# Patient Record
Sex: FEMALE | Race: WHITE | NOT HISPANIC OR LATINO | Employment: OTHER | URBAN - NONMETROPOLITAN AREA
[De-identification: names, ages, dates, MRNs, and addresses within clinical notes are randomized per-mention and may not be internally consistent; named-entity substitution may affect disease eponyms.]

---

## 2024-10-23 ENCOUNTER — OFFICE VISIT (OUTPATIENT)
Dept: CARDIOLOGY | Facility: CLINIC | Age: 84
End: 2024-10-23
Payer: MEDICARE

## 2024-10-23 VITALS
OXYGEN SATURATION: 97 % | SYSTOLIC BLOOD PRESSURE: 145 MMHG | HEART RATE: 103 BPM | WEIGHT: 177.2 LBS | DIASTOLIC BLOOD PRESSURE: 74 MMHG

## 2024-10-23 DIAGNOSIS — R53.83 FATIGUE, UNSPECIFIED TYPE: ICD-10-CM

## 2024-10-23 DIAGNOSIS — I10 PRIMARY HYPERTENSION: ICD-10-CM

## 2024-10-23 DIAGNOSIS — M79.89 LEG SWELLING: ICD-10-CM

## 2024-10-23 DIAGNOSIS — R55 SYNCOPE AND COLLAPSE: Primary | ICD-10-CM

## 2024-10-23 DIAGNOSIS — R06.02 SHORTNESS OF BREATH: ICD-10-CM

## 2024-10-23 DIAGNOSIS — R42 DIZZY: ICD-10-CM

## 2024-10-23 RX ORDER — MONTELUKAST SODIUM 10 MG/1
10 TABLET ORAL NIGHTLY
COMMUNITY
End: 2024-10-23 | Stop reason: SDUPTHER

## 2024-10-23 RX ORDER — GABAPENTIN 300 MG/1
300 CAPSULE ORAL 2 TIMES DAILY
COMMUNITY

## 2024-10-23 RX ORDER — LEVOTHYROXINE SODIUM 75 UG/1
75 TABLET ORAL DAILY
COMMUNITY

## 2024-10-23 RX ORDER — ROSUVASTATIN CALCIUM 10 MG/1
10 TABLET, COATED ORAL DAILY
COMMUNITY

## 2024-10-23 RX ORDER — FLUTICASONE PROPIONATE 50 UG/1
2 SPRAY, METERED NASAL DAILY
COMMUNITY

## 2024-10-23 RX ORDER — LOSARTAN POTASSIUM 25 MG/1
25 TABLET ORAL DAILY
Qty: 30 TABLET | Refills: 6 | Status: SHIPPED | OUTPATIENT
Start: 2024-10-23

## 2024-10-23 RX ORDER — LANOLIN ALCOHOL/MO/W.PET/CERES
1000 CREAM (GRAM) TOPICAL DAILY
COMMUNITY

## 2024-10-23 RX ORDER — MONTELUKAST SODIUM 10 MG/1
10 TABLET ORAL NIGHTLY
COMMUNITY

## 2024-10-23 RX ORDER — PANTOPRAZOLE SODIUM 40 MG/1
40 TABLET, DELAYED RELEASE ORAL DAILY
COMMUNITY

## 2024-10-23 RX ORDER — AMLODIPINE BESYLATE 10 MG/1
10 TABLET ORAL DAILY
COMMUNITY

## 2024-10-23 NOTE — PROGRESS NOTES
Subjective     Karrie Sheth is a 84 y.o. female who presents to day for Establish Care and Hypertension (GFR  20's Nephrologist stopped 3 blood pressure medications.).    CHIEF COMPLIANT  Chief Complaint   Patient presents with    Establish Care    Hypertension     GFR  20's Nephrologist stopped 3 blood pressure medications.       Active Problems:  Problem List Items Addressed This Visit    None  Visit Diagnoses       Syncope and collapse    -  Primary    Relevant Orders    ECG 12 Lead    Adult Transthoracic Echo Complete W/ Cont if Necessary Per Protocol    Holter Monitor - 72 Hour Up To 15 Days    Stress Test With Myocardial Perfusion One Day    Duplex Carotid - Right Ultrasound CAR    Primary hypertension        Relevant Medications    amLODIPine (NORVASC) 10 MG tablet    losartan (Cozaar) 25 MG tablet    Other Relevant Orders    ECG 12 Lead    Adult Transthoracic Echo Complete W/ Cont if Necessary Per Protocol    Holter Monitor - 72 Hour Up To 15 Days    Stress Test With Myocardial Perfusion One Day    Duplex Carotid - Right Ultrasound CAR    Fatigue, unspecified type        Relevant Orders    ECG 12 Lead    Adult Transthoracic Echo Complete W/ Cont if Necessary Per Protocol    Holter Monitor - 72 Hour Up To 15 Days    Stress Test With Myocardial Perfusion One Day    Duplex Carotid - Right Ultrasound CAR    Leg swelling        Relevant Orders    ECG 12 Lead    Adult Transthoracic Echo Complete W/ Cont if Necessary Per Protocol    Holter Monitor - 72 Hour Up To 15 Days    Stress Test With Myocardial Perfusion One Day    Duplex Carotid - Right Ultrasound CAR    Dizzy        Relevant Orders    Adult Transthoracic Echo Complete W/ Cont if Necessary Per Protocol    Holter Monitor - 72 Hour Up To 15 Days    Stress Test With Myocardial Perfusion One Day    Duplex Carotid - Right Ultrasound CAR    Shortness of breath        Relevant Orders    Adult Transthoracic Echo Complete W/ Cont if Necessary Per Protocol     Holter Monitor - 72 Hour Up To 15 Days    Stress Test With Myocardial Perfusion One Day    Duplex Carotid - Right Ultrasound CAR        Problem list  1.  Chronic arterial hypertension  2.  Fatigue  3.  Lower extremity edema  4.  Syncope    HPI  HPI  Ms. Karrie Sheth is a 84-year-old female patient who is being seen today for cardiac evaluation    Patient does have a history of chronic arterial hypertension which her blood pressure is elevated today at 145/74 heart rate of 97.  She is currently being treated with amlodipine.  Says her blood pressure varies.  Says it can range anywhere from 140s to 180s.  She does have some associated blurred vision at times.    Patient does not have a history of a episode of syncope about 2 months ago.  Says she was in the bathroom and stood up and then her son's wife had found her on the floor retirement between the bathroom in the hallway.  She says she does not remember any of the events that occurred after standing up.  She did not lose bowel or bladder control.  She was assisted to the bedroom after they found her in the floor in which she did refuse to go to the ER at that time.  They called her son and by the time she is talking to him on the phone her son reports that she is alert and oriented back to herself.  She has had no more reoccurrence of syncope since this episode.    She also reports shortness of breath occurs with activity such as walking with a rollator.  Says that she walks through the house show also become dyspneic.  She does have some dyspnea at rest at times.  She does also have orthopnea where she reports that she uses 3 pillows but rarely lays on her back.    Patient does have lower extremity edema that is isolated to the feet and ankles.  Says more today because a bed downtown and she has been up more.    Patient does have a history of hyperlipidemia in which she is on rosuvastatin.  This was started after her most recent lipid panel that identified  triglycerides 314, HDL 31, .  At the same time her CMP showed a glucose elevated at 109, creatinine 1.19 and EGFR 45.    Patient does have fatigue in which she is always tired especially during the day but seems to do better at Night.    Patient's son reports over the last 3 months that he has noticed a big improvement in her state of wellbeing.  She is previously in [place] but once he brought her here with him that she seems to be doing quite a bit better.    Patient also reports chronic headaches in which she has a history of and these are her typical headaches.  She does report that she has a headache today.    Son reports that her heart rate was previously running low as well as her blood pressure and she was seen by Dr. Martínez nephrologist in which had stopped several of her medications including hydrochlorothiazide, metoprolol, and losartan.  PRIOR MEDS  Current Outpatient Medications on File Prior to Visit   Medication Sig Dispense Refill    amLODIPine (NORVASC) 10 MG tablet Take 1 tablet by mouth Daily.      gabapentin (NEURONTIN) 300 MG capsule Take 1 capsule by mouth 2 (Two) Times a Day.      levothyroxine (SYNTHROID, LEVOTHROID) 75 MCG tablet Take 1 tablet by mouth Daily.      pantoprazole (PROTONIX) 40 MG EC tablet Take 1 tablet by mouth Daily.      rosuvastatin (CRESTOR) 10 MG tablet Take 1 tablet by mouth Daily.      vitamin B-12 (CYANOCOBALAMIN) 1000 MCG tablet Take 1 tablet by mouth Daily.      [DISCONTINUED] montelukast (SINGULAIR) 10 MG tablet Take 1 tablet by mouth Every Night.      fluticasone (FLONASE) 50 MCG/ACT nasal spray Administer 2 sprays into the nostril(s) as directed by provider Daily.      montelukast (SINGULAIR) 10 MG tablet Take 1 tablet by mouth Every Night.       No current facility-administered medications on file prior to visit.       ALLERGIES  Penicillins and Sulfa antibiotics    HISTORY  Past Medical History:   Diagnosis Date    Back pain     H/O lumpectomy     Left breast     Headaches due to old head injury     Hypertension     Neck pain     Thyroid disease        Social History     Socioeconomic History    Marital status:    Tobacco Use    Smoking status: Never    Smokeless tobacco: Never   Vaping Use    Vaping status: Never Used   Substance and Sexual Activity    Alcohol use: Never    Drug use: Never    Sexual activity: Defer       Family History   Problem Relation Age of Onset    Lung cancer Father     Cancer Daughter        Review of Systems   Constitutional:  Positive for fatigue. Negative for chills and fever.   HENT:  Positive for congestion and rhinorrhea. Negative for sore throat.    Eyes:  Positive for visual disturbance (fuzzy vision).   Respiratory:  Positive for cough. Negative for apnea, chest tightness and shortness of breath (with activity and rest).    Cardiovascular:  Positive for leg swelling (feet and ankles). Negative for chest pain and palpitations.   Gastrointestinal:  Positive for nausea and vomiting. Negative for constipation and diarrhea.   Musculoskeletal:  Positive for arthralgias, back pain and neck pain.   Skin:  Negative for rash and wound.   Allergic/Immunologic: Positive for environmental allergies. Negative for food allergies.   Neurological:  Positive for dizziness (Random), syncope (2 months ago was standing in the bathroom is last thing she remembers  has stopped metoprolol HCTZ Losartan), light-headedness and headaches. Negative for weakness.   Hematological:  Does not bruise/bleed easily.   Psychiatric/Behavioral:  Positive for sleep disturbance (NO SOB has night and days mixed up).        Objective     VITALS: /74 (BP Location: Right arm, Patient Position: Sitting, Cuff Size: Adult)   Pulse 103   Wt 80.4 kg (177 lb 3.2 oz)   SpO2 97%     LABS:   Lab Results (most recent)       None            IMAGING:   No Images in the past 120 days found..    EXAM:  Physical Exam  Vitals and nursing note reviewed.   Constitutional:        Appearance: She is well-developed.   HENT:      Head: Normocephalic.   Neck:      Thyroid: No thyroid mass.      Vascular: No carotid bruit or JVD.      Trachea: Trachea and phonation normal.   Cardiovascular:      Rate and Rhythm: Normal rate and regular rhythm.      Pulses:           Radial pulses are 2+ on the right side and 2+ on the left side.        Posterior tibial pulses are 2+ on the right side and 2+ on the left side.      Heart sounds: Murmur heard.      No friction rub. No gallop.   Pulmonary:      Effort: Pulmonary effort is normal. No respiratory distress.      Breath sounds: Normal breath sounds. No wheezing or rales.   Musculoskeletal:         General: Normal range of motion.      Cervical back: Neck supple.      Right lower leg: Edema present.      Left lower leg: Edema present.   Skin:     General: Skin is warm and dry.      Capillary Refill: Capillary refill takes less than 2 seconds.      Findings: No rash.   Neurological:      Mental Status: She is alert and oriented to person, place, and time.   Psychiatric:         Speech: Speech normal.         Behavior: Behavior normal.         Thought Content: Thought content normal.         Judgment: Judgment normal.         Procedure     ECG 12 Lead    Date/Time: 10/23/2024 2:17 PM  Performed by: Driss Garcia APRN    Authorized by: Driss Garcia APRN  Previous ECG: no previous ECG available  Rhythm: sinus rhythm  Rate: normal  BPM: 79  Conduction: 1st degree AV block  QRS axis: normal  Other findings: non-specific ST-T wave changes  Comments: Qtc 392 ms  No acute changes             Assessment & Plan    Diagnosis Plan   1. Syncope and collapse  ECG 12 Lead    Adult Transthoracic Echo Complete W/ Cont if Necessary Per Protocol    Holter Monitor - 72 Hour Up To 15 Days    Stress Test With Myocardial Perfusion One Day    Duplex Carotid - Right Ultrasound CAR      2. Primary hypertension  ECG 12 Lead    Adult Transthoracic Echo Complete W/ Cont if  Necessary Per Protocol    Holter Monitor - 72 Hour Up To 15 Days    Stress Test With Myocardial Perfusion One Day    Duplex Carotid - Right Ultrasound CAR    losartan (Cozaar) 25 MG tablet      3. Fatigue, unspecified type  ECG 12 Lead    Adult Transthoracic Echo Complete W/ Cont if Necessary Per Protocol    Holter Monitor - 72 Hour Up To 15 Days    Stress Test With Myocardial Perfusion One Day    Duplex Carotid - Right Ultrasound CAR      4. Leg swelling  ECG 12 Lead    Adult Transthoracic Echo Complete W/ Cont if Necessary Per Protocol    Holter Monitor - 72 Hour Up To 15 Days    Stress Test With Myocardial Perfusion One Day    Duplex Carotid - Right Ultrasound CAR      5. Dizzy  Adult Transthoracic Echo Complete W/ Cont if Necessary Per Protocol    Holter Monitor - 72 Hour Up To 15 Days    Stress Test With Myocardial Perfusion One Day    Duplex Carotid - Right Ultrasound CAR      6. Shortness of breath  Adult Transthoracic Echo Complete W/ Cont if Necessary Per Protocol    Holter Monitor - 72 Hour Up To 15 Days    Stress Test With Myocardial Perfusion One Day    Duplex Carotid - Right Ultrasound CAR      1.  Due to patient's syncope, shortness of breath, and comorbidities of hypertension and hyperlipidemia I would like for her to go under an ischemic workup including stress test and echocardiogram.  She is not a candidate for the treadmill due to the fact she has to use a rollator to assist with ambulation.    2.  Patient's blood pressure is up today and ranges between 140 and 180s.  Son reported that he believes he needs to be back on a little bit of at least the losartan.  We did discuss the potential benefits and risk.  We will start patient back on losartan 25 mg daily.  They will monitor blood pressure closely.    3.  Due to patient's syncope dizziness and headaches we will do a carotid duplex to rule out carotid artery disease as a potential cause of patient's symptoms.    4.  Also due to patient's syncope  I would like to have her wear a cardiac event monitor for 14 days to help determine the potential etiology of her syncope.    5.  Informed of signs and symptoms of ACS and advised to seek emergent treatment for any new worsening symptoms.  Patient also advised sooner follow-up as needed.  Also advised to follow-up with family doctor as needed  This note is dictated utilizing voice recognition software.  Although this record has been proof read, transcriptional errors may still be present. If questions occur regarding the content of this record please do not hesitate to call our office.  I have reviewed and confirmed the accuracy of the ROS as documented by the MA/LPN/RN MILAD Gutierrez    Return if symptoms worsen or fail to improve, for Next scheduled follow up.    Diagnoses and all orders for this visit:    1. Syncope and collapse (Primary)  -     ECG 12 Lead  -     Adult Transthoracic Echo Complete W/ Cont if Necessary Per Protocol; Future  -     Holter Monitor - 72 Hour Up To 15 Days; Future  -     Stress Test With Myocardial Perfusion One Day; Future  -     Duplex Carotid - Right Ultrasound CAR; Future    2. Primary hypertension  -     ECG 12 Lead  -     Adult Transthoracic Echo Complete W/ Cont if Necessary Per Protocol; Future  -     Holter Monitor - 72 Hour Up To 15 Days; Future  -     Stress Test With Myocardial Perfusion One Day; Future  -     Duplex Carotid - Right Ultrasound CAR; Future  -     losartan (Cozaar) 25 MG tablet; Take 1 tablet by mouth Daily.  Dispense: 30 tablet; Refill: 6    3. Fatigue, unspecified type  -     ECG 12 Lead  -     Adult Transthoracic Echo Complete W/ Cont if Necessary Per Protocol; Future  -     Holter Monitor - 72 Hour Up To 15 Days; Future  -     Stress Test With Myocardial Perfusion One Day; Future  -     Duplex Carotid - Right Ultrasound CAR; Future    4. Leg swelling  -     ECG 12 Lead  -     Adult Transthoracic Echo Complete W/ Cont if Necessary Per Protocol;  Future  -     Holter Monitor - 72 Hour Up To 15 Days; Future  -     Stress Test With Myocardial Perfusion One Day; Future  -     Duplex Carotid - Right Ultrasound CAR; Future    5. Dizzy  -     Adult Transthoracic Echo Complete W/ Cont if Necessary Per Protocol; Future  -     Holter Monitor - 72 Hour Up To 15 Days; Future  -     Stress Test With Myocardial Perfusion One Day; Future  -     Duplex Carotid - Right Ultrasound CAR; Future    6. Shortness of breath  -     Adult Transthoracic Echo Complete W/ Cont if Necessary Per Protocol; Future  -     Holter Monitor - 72 Hour Up To 15 Days; Future  -     Stress Test With Myocardial Perfusion One Day; Future  -     Duplex Carotid - Right Ultrasound CAR; Future        Karrie Sheth  reports that she has never smoked. She has never used smokeless tobacco. I have educated her on the risk of diseases from using tobacco products. Patient does not smoke.          BMI cannot be calculated due to outdated height or weight values.  Please input a current height/weight in Vitals and re-renter [id number] in Note to pull in correct documentation based on BMI range.    Advance Care Planning   ACP discussion was held with the patient during this visit. Patient has an advance directive (not in EMR), copy requested. Patient is DNR .             MEDS ORDERED DURING VISIT:  New Medications Ordered This Visit   Medications    losartan (Cozaar) 25 MG tablet     Sig: Take 1 tablet by mouth Daily.     Dispense:  30 tablet     Refill:  6           This document has been electronically signed by Driss Garcia Jr., APRRUTHY  October 23, 2024 14:52 EDT

## 2024-11-19 ENCOUNTER — LAB (OUTPATIENT)
Dept: LAB | Facility: HOSPITAL | Age: 84
End: 2024-11-19
Payer: MEDICARE

## 2024-11-19 DIAGNOSIS — R06.02 SHORTNESS OF BREATH: ICD-10-CM

## 2024-11-19 DIAGNOSIS — R55 SYNCOPE AND COLLAPSE: ICD-10-CM

## 2024-11-19 DIAGNOSIS — R53.83 FATIGUE, UNSPECIFIED TYPE: ICD-10-CM

## 2024-11-19 DIAGNOSIS — I10 PRIMARY HYPERTENSION: ICD-10-CM

## 2024-11-19 DIAGNOSIS — R42 DIZZY: ICD-10-CM

## 2024-11-19 DIAGNOSIS — M79.89 LEG SWELLING: ICD-10-CM

## 2024-11-19 LAB
ANION GAP SERPL CALCULATED.3IONS-SCNC: 13.5 MMOL/L (ref 5–15)
BUN SERPL-MCNC: 12 MG/DL (ref 8–23)
BUN/CREAT SERPL: 14 (ref 7–25)
CALCIUM SPEC-SCNC: 10.1 MG/DL (ref 8.6–10.5)
CHLORIDE SERPL-SCNC: 97 MMOL/L (ref 98–107)
CO2 SERPL-SCNC: 26.5 MMOL/L (ref 22–29)
CREAT SERPL-MCNC: 0.86 MG/DL (ref 0.57–1)
EGFRCR SERPLBLD CKD-EPI 2021: 65.9 ML/MIN/1.73
GLUCOSE SERPL-MCNC: 125 MG/DL (ref 65–99)
POTASSIUM SERPL-SCNC: 3.8 MMOL/L (ref 3.5–5.2)
SODIUM SERPL-SCNC: 137 MMOL/L (ref 136–145)

## 2024-11-19 PROCEDURE — 80048 BASIC METABOLIC PNL TOTAL CA: CPT

## 2024-11-19 PROCEDURE — 36415 COLL VENOUS BLD VENIPUNCTURE: CPT

## 2024-11-20 ENCOUNTER — TELEPHONE (OUTPATIENT)
Dept: CARDIOLOGY | Facility: CLINIC | Age: 84
End: 2024-11-20
Payer: MEDICARE

## 2024-11-20 NOTE — PROGRESS NOTES
BMP showed elevated glucose 125.  Normal renal function.  Chloride at 97 otherwise relatively unremarkable.

## 2024-11-20 NOTE — TELEPHONE ENCOUNTER
I called and spoke with May Sheth who is listed on verbal release. I advised of lab results as follows:    BMP showed elevated glucose 125.  Normal renal function.  Chloride at 97 otherwise relatively unremarkable.

## 2024-12-10 ENCOUNTER — HOSPITAL ENCOUNTER (OUTPATIENT)
Dept: CARDIOLOGY | Facility: HOSPITAL | Age: 84
Discharge: HOME | End: 2024-12-10
Payer: MEDICARE

## 2024-12-10 VITALS — WEIGHT: 177.25 LBS | BODY MASS INDEX: 26.86 KG/M2 | HEIGHT: 68 IN

## 2024-12-10 DIAGNOSIS — R42 DIZZY: ICD-10-CM

## 2024-12-10 DIAGNOSIS — M79.89 LEG SWELLING: ICD-10-CM

## 2024-12-10 DIAGNOSIS — I10 PRIMARY HYPERTENSION: ICD-10-CM

## 2024-12-10 DIAGNOSIS — R06.02 SHORTNESS OF BREATH: ICD-10-CM

## 2024-12-10 DIAGNOSIS — R53.83 FATIGUE, UNSPECIFIED TYPE: ICD-10-CM

## 2024-12-10 DIAGNOSIS — R55 SYNCOPE AND COLLAPSE: ICD-10-CM

## 2024-12-10 PROCEDURE — 93880 EXTRACRANIAL BILAT STUDY: CPT

## 2024-12-10 PROCEDURE — 93306 TTE W/DOPPLER COMPLETE: CPT

## 2024-12-14 LAB
AORTIC DIMENSIONLESS INDEX: 1.2 (DI)
BH CV ECHO MEAS - ACS: 1.75 CM
BH CV ECHO MEAS - AO MAX PG: 8.9 MMHG
BH CV ECHO MEAS - AO MEAN PG: 4.2 MMHG
BH CV ECHO MEAS - AO ROOT DIAM: 2.8 CM
BH CV ECHO MEAS - AO V2 MAX: 148.8 CM/SEC
BH CV ECHO MEAS - AO V2 VTI: 31 CM
BH CV ECHO MEAS - EDV(CUBED): 105.8 ML
BH CV ECHO MEAS - EF(MOD-BP): 68 %
BH CV ECHO MEAS - ESV(CUBED): 25.1 ML
BH CV ECHO MEAS - FS: 38.1 %
BH CV ECHO MEAS - IVS/LVPW: 0.93 CM
BH CV ECHO MEAS - IVSD: 0.92 CM
BH CV ECHO MEAS - LA DIMENSION: 4.4 CM
BH CV ECHO MEAS - LAT PEAK E' VEL: 7.3 CM/SEC
BH CV ECHO MEAS - LV MASS(C)D: 157.1 GRAMS
BH CV ECHO MEAS - LV MAX PG: 11.9 MMHG
BH CV ECHO MEAS - LV MEAN PG: 5.2 MMHG
BH CV ECHO MEAS - LV V1 MAX: 172.4 CM/SEC
BH CV ECHO MEAS - LV V1 VTI: 37.5 CM
BH CV ECHO MEAS - LVIDD: 4.7 CM
BH CV ECHO MEAS - LVIDS: 2.9 CM
BH CV ECHO MEAS - LVPWD: 0.99 CM
BH CV ECHO MEAS - MED PEAK E' VEL: 6.6 CM/SEC
BH CV ECHO MEAS - MV A MAX VEL: 118.9 CM/SEC
BH CV ECHO MEAS - MV DEC SLOPE: 450.4 CM/SEC2
BH CV ECHO MEAS - MV DEC TIME: 0.22 SEC
BH CV ECHO MEAS - MV E MAX VEL: 101 CM/SEC
BH CV ECHO MEAS - MV E/A: 0.85
BH CV ECHO MEAS - MV MAX PG: 5.9 MMHG
BH CV ECHO MEAS - MV MEAN PG: 2.8 MMHG
BH CV ECHO MEAS - MV P1/2T: 69.7 MSEC
BH CV ECHO MEAS - MV V2 VTI: 33.1 CM
BH CV ECHO MEAS - MVA(P1/2T): 3.2 CM2
BH CV ECHO MEAS - PA V2 MAX: 120.8 CM/SEC
BH CV ECHO MEAS - RAP SYSTOLE: 8 MMHG
BH CV ECHO MEAS - RV MAX PG: 3.8 MMHG
BH CV ECHO MEAS - RV V1 MAX: 97.3 CM/SEC
BH CV ECHO MEAS - RV V1 VTI: 23.2 CM
BH CV ECHO MEAS - RVDD: 3.5 CM
BH CV ECHO MEAS - RVSP: 36.4 MMHG
BH CV ECHO MEAS - TAPSE (>1.6): 2.48 CM
BH CV ECHO MEAS - TR MAX PG: 28.4 MMHG
BH CV ECHO MEAS - TR MAX VEL: 266.6 CM/SEC
BH CV ECHO MEASUREMENTS AVERAGE E/E' RATIO: 14.53
BH CV XLRA - TDI S': 13.3 CM/SEC
BH CV XLRA MEAS LEFT CAROTID BULB EDV: -10.4 CM/SEC
BH CV XLRA MEAS LEFT CAROTID BULB PSV: -118 CM/SEC
BH CV XLRA MEAS LEFT DIST CCA EDV: -12.1 CM/SEC
BH CV XLRA MEAS LEFT DIST CCA PSV: -102 CM/SEC
BH CV XLRA MEAS LEFT DIST ICA EDV: -32.2 CM/SEC
BH CV XLRA MEAS LEFT DIST ICA PSV: -116 CM/SEC
BH CV XLRA MEAS LEFT ICA/CCA RATIO: 1.2
BH CV XLRA MEAS LEFT MID ICA EDV: -19.6 CM/SEC
BH CV XLRA MEAS LEFT MID ICA PSV: -103 CM/SEC
BH CV XLRA MEAS LEFT PROX CCA EDV: 15.4 CM/SEC
BH CV XLRA MEAS LEFT PROX CCA PSV: 170 CM/SEC
BH CV XLRA MEAS LEFT PROX ECA PSV: -112 CM/SEC
BH CV XLRA MEAS LEFT PROX ICA EDV: 19.1 CM/SEC
BH CV XLRA MEAS LEFT PROX ICA PSV: 119 CM/SEC
BH CV XLRA MEAS LEFT VERTEBRAL A EDV: -9.1 CM/SEC
BH CV XLRA MEAS LEFT VERTEBRAL A PSV: -39.9 CM/SEC
BH CV XLRA MEAS RIGHT CAROTID BULB EDV: -14.7 CM/SEC
BH CV XLRA MEAS RIGHT CAROTID BULB PSV: -86.2 CM/SEC
BH CV XLRA MEAS RIGHT DIST CCA EDV: 12.6 CM/SEC
BH CV XLRA MEAS RIGHT DIST CCA PSV: 111 CM/SEC
BH CV XLRA MEAS RIGHT DIST ICA EDV: -10.4 CM/SEC
BH CV XLRA MEAS RIGHT DIST ICA PSV: -59.8 CM/SEC
BH CV XLRA MEAS RIGHT ICA/CCA RATIO: 0.8
BH CV XLRA MEAS RIGHT MID ICA EDV: 12.6 CM/SEC
BH CV XLRA MEAS RIGHT MID ICA PSV: 68 CM/SEC
BH CV XLRA MEAS RIGHT PROX CCA EDV: 11.2 CM/SEC
BH CV XLRA MEAS RIGHT PROX CCA PSV: 111 CM/SEC
BH CV XLRA MEAS RIGHT PROX ECA PSV: -147 CM/SEC
BH CV XLRA MEAS RIGHT PROX ICA EDV: -12.1 CM/SEC
BH CV XLRA MEAS RIGHT PROX ICA PSV: -89.2 CM/SEC
BH CV XLRA MEAS RIGHT VERTEBRAL A EDV: -0.55 CM/SEC
BH CV XLRA MEAS RIGHT VERTEBRAL A PSV: -32.4 CM/SEC
SINUS: 2.7 CM

## 2024-12-17 ENCOUNTER — TELEPHONE (OUTPATIENT)
Dept: CARDIOLOGY | Facility: CLINIC | Age: 84
End: 2024-12-17
Payer: MEDICARE

## 2024-12-17 NOTE — TELEPHONE ENCOUNTER
Ejection fraction 65 to 70%, grade 1A diastolic dysfunction, trivial MR AI and trivial to mild TR.  RVSP in mid 30s    No evidence of hemodynamically significant carotid disease.  Keep follow up    Follow-up

## 2024-12-17 NOTE — TELEPHONE ENCOUNTER
Caller: Yumiko Flores    Relationship: Emergency Contact    Best call back number: [phone]    Caller requesting test results: YUMIKO FLORES     What test was performed: ECHO, DUPLE CAROTID     When was the test performed: 12.14.24    Where was the test performed: OFFICE    Additional notes: PATIENT WANTS TO GO OVER RESULTS.

## 2024-12-18 ENCOUNTER — TELEPHONE (OUTPATIENT)
Dept: CARDIOLOGY | Facility: CLINIC | Age: 84
End: 2024-12-18
Payer: MEDICARE

## 2024-12-18 NOTE — TELEPHONE ENCOUNTER
ECHO/US CAROTID  Pt notified of no acute findings. Provider will discuss results at f/u. Pt reminded of appt date and time.  ----- Message from Eri THAKKAR sent at 12/18/2024  9:45 AM EST -----    ----- Message -----  From: Driss Garcia APRN  Sent: 12/17/2024   5:29 PM EST  To: Eri Lynn MA    No evidence of hemodynamically significant carotid disease.  Keep follow up

## 2025-02-05 ENCOUNTER — TELEPHONE (OUTPATIENT)
Dept: CARDIOLOGY | Facility: CLINIC | Age: 85
End: 2025-02-05
Payer: MEDICARE

## 2025-02-05 NOTE — TELEPHONE ENCOUNTER
Called patient's son, Nguyễn (on verbal release). He was visiting with his mother. Results given over speaker. She is doing well and request to keep follow up next month.   Left message for patient to return call.     ----- Message from Eri THAKKAR sent at 2/5/2025  8:10 AM EST -----    ----- Message -----  From: Driss Garcia APRN  Sent: 2/3/2025   5:02 PM EST  To: Eri Lynn MA    Patient did have a significant SVT burden of 248 episodes of SVT with the fastest being 4 beats and rates up to 162 bpm longest being 14.8 seconds with an average heart rate of 120.  Her overall PAC burden was 5.8%.  If patient is symptomatic we can   bring her in sooner.  None of these arrhythmias would explain syncope.